# Patient Record
Sex: FEMALE | Race: WHITE | NOT HISPANIC OR LATINO | Employment: FULL TIME | URBAN - METROPOLITAN AREA
[De-identification: names, ages, dates, MRNs, and addresses within clinical notes are randomized per-mention and may not be internally consistent; named-entity substitution may affect disease eponyms.]

---

## 2020-06-01 ENCOUNTER — OFFICE VISIT (OUTPATIENT)
Dept: OBSTETRICS AND GYNECOLOGY | Age: 45
End: 2020-06-01

## 2020-06-01 VITALS
WEIGHT: 129 LBS | DIASTOLIC BLOOD PRESSURE: 80 MMHG | BODY MASS INDEX: 22.86 KG/M2 | HEIGHT: 63 IN | SYSTOLIC BLOOD PRESSURE: 130 MMHG

## 2020-06-01 DIAGNOSIS — Z12.4 SCREENING FOR CERVICAL CANCER: ICD-10-CM

## 2020-06-01 DIAGNOSIS — Z80.3 FAMILY HISTORY OF BREAST CANCER: ICD-10-CM

## 2020-06-01 DIAGNOSIS — Z20.2 EXPOSURE TO STD: ICD-10-CM

## 2020-06-01 DIAGNOSIS — R92.8 ABNORMAL MAMMOGRAM: Primary | ICD-10-CM

## 2020-06-01 PROBLEM — Z97.5 IUD CONTRACEPTION: Status: ACTIVE | Noted: 2018-09-18

## 2020-06-01 PROBLEM — F41.9 ANXIETY: Status: ACTIVE | Noted: 2020-06-01

## 2020-06-01 PROCEDURE — 99386 PREV VISIT NEW AGE 40-64: CPT | Performed by: OBSTETRICS & GYNECOLOGY

## 2020-06-01 RX ORDER — VALACYCLOVIR HYDROCHLORIDE 1 G/1
1000 TABLET, FILM COATED ORAL 2 TIMES DAILY
Qty: 20 TABLET | Refills: 4 | Status: SHIPPED | OUTPATIENT
Start: 2020-06-01

## 2020-06-01 RX ORDER — DEXTROAMPHETAMINE SACCHARATE, AMPHETAMINE ASPARTATE MONOHYDRATE, DEXTROAMPHETAMINE SULFATE AND AMPHETAMINE SULFATE 7.5; 7.5; 7.5; 7.5 MG/1; MG/1; MG/1; MG/1
30 CAPSULE, EXTENDED RELEASE ORAL EVERY MORNING
COMMUNITY
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
## 2020-06-02 LAB
HCV AB S/CO SERPL IA: <0.1 S/CO RATIO (ref 0–0.9)
HIV 1+2 AB+HIV1 P24 AG SERPL QL IA: NON REACTIVE
RPR SER QL: NORMAL

## 2020-06-03 ENCOUNTER — TELEPHONE (OUTPATIENT)
Dept: OBSTETRICS AND GYNECOLOGY | Age: 45
End: 2020-06-03

## 2020-06-03 LAB
C TRACH RRNA CVX QL NAA+PROBE: NEGATIVE
CONV .: NORMAL
CYTOLOGIST CVX/VAG CYTO: NORMAL
CYTOLOGY CVX/VAG DOC CYTO: NORMAL
CYTOLOGY CVX/VAG DOC THIN PREP: NORMAL
DX ICD CODE: NORMAL
HIV 1 & 2 AB SER-IMP: NORMAL
N GONORRHOEA RRNA CVX QL NAA+PROBE: NEGATIVE
OTHER STN SPEC: NORMAL
STAT OF ADQ CVX/VAG CYTO-IMP: NORMAL
T VAGINALIS RRNA SPEC QL NAA+PROBE: NEGATIVE

## 2020-06-03 NOTE — PROGRESS NOTES
Please notify that her pap was normal. Also her testing for gonorrhea, chlamydia, trichomonas and also HIV, hepatitis, syphilis were negative    Samantha De Oliveira MD  6/3/2020  12:24

## 2020-06-03 NOTE — TELEPHONE ENCOUNTER
----- Message from Samantha De Oliveira MD sent at 6/3/2020 12:24 PM EDT -----  Please notify that her pap was normal. Also her testing for gonorrhea, chlamydia, trichomonas and also HIV, hepatitis, syphilis were negative    Samantha De Oliveira MD  6/3/2020  12:24

## 2020-07-12 ENCOUNTER — APPOINTMENT (OUTPATIENT)
Dept: GENERAL RADIOLOGY | Facility: HOSPITAL | Age: 45
End: 2020-07-12

## 2020-07-12 PROCEDURE — 73130 X-RAY EXAM OF HAND: CPT | Performed by: EMERGENCY MEDICINE

## 2021-03-31 ENCOUNTER — BULK ORDERING (OUTPATIENT)
Dept: CASE MANAGEMENT | Facility: OTHER | Age: 46
End: 2021-03-31

## 2021-03-31 DIAGNOSIS — Z23 IMMUNIZATION DUE: ICD-10-CM

## 2021-06-29 ENCOUNTER — OFFICE VISIT (OUTPATIENT)
Dept: OBSTETRICS AND GYNECOLOGY | Age: 46
End: 2021-06-29

## 2021-06-29 VITALS
HEIGHT: 63 IN | DIASTOLIC BLOOD PRESSURE: 80 MMHG | SYSTOLIC BLOOD PRESSURE: 124 MMHG | WEIGHT: 128 LBS | BODY MASS INDEX: 22.68 KG/M2

## 2021-06-29 DIAGNOSIS — Z97.5 IUD CONTRACEPTION: ICD-10-CM

## 2021-06-29 DIAGNOSIS — Z11.51 SCREENING FOR HPV (HUMAN PAPILLOMAVIRUS): ICD-10-CM

## 2021-06-29 DIAGNOSIS — N92.6 IRREGULAR BLEEDING: ICD-10-CM

## 2021-06-29 DIAGNOSIS — Z01.419 WELL WOMAN EXAM WITH ROUTINE GYNECOLOGICAL EXAM: Primary | ICD-10-CM

## 2021-06-29 DIAGNOSIS — Z80.3 FAMILY HISTORY OF BREAST CANCER: ICD-10-CM

## 2021-06-29 DIAGNOSIS — Z72.0 TOBACCO ABUSE: ICD-10-CM

## 2021-06-29 PROCEDURE — 99396 PREV VISIT EST AGE 40-64: CPT | Performed by: PHYSICIAN ASSISTANT

## 2021-06-29 RX ORDER — HYDROXYZINE HYDROCHLORIDE 10 MG/1
TABLET, FILM COATED ORAL AS NEEDED
COMMUNITY
Start: 2021-06-02 | End: 2021-12-02

## 2021-06-29 RX ORDER — VARENICLINE TARTRATE 1 MG/1
1 TABLET, FILM COATED ORAL 2 TIMES DAILY
Qty: 56 TABLET | Refills: 1 | Status: SHIPPED | OUTPATIENT
Start: 2021-07-27 | End: 2021-08-17 | Stop reason: SDUPTHER

## 2021-06-29 RX ORDER — MOMETASONE FUROATE 50 UG/1
SPRAY, METERED NASAL
COMMUNITY
End: 2021-09-24

## 2021-06-29 NOTE — PROGRESS NOTES
Subjective     Chief Complaint   Patient presents with   • Gynecologic Exam     last pap 20 (-) MG 10/20/20 pt c/o recent chin hair growth        History of Present Illness    Misty Hernandez is a 46 y.o.  who presents for annual exam.    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  Would like to have this done    Wants rpt pap d/t remote h/o abnormal paps  No std testing needed    iud in place and happy with it  Has had some spotting and cramping recently  Can't feel strings but hasn't been able to     Has noted some chin hair  Wonders about her hormones  Says her Mom had the same thing    Engaged and considering another child  Kaylie has no children    Is a smoker and aware she should quit  Has used chantix in the past with good results    Pt of Dr De Oliveira  Her menses are irregular, lasting 0-3 days, dysmenorrhea none   Obstetric History:  OB History        2    Para   2    Term   2            AB        Living   2       SAB        TAB        Ectopic        Molar        Multiple        Live Births   2               Menstrual History:     No LMP recorded. Patient has had an implant.         Current contraception: IUD  History of abnormal Pap smear: yes - at 20 yoa  Received Gardasil immunization: no  Perform regular self breast exam: yes - occl  Family history of uterine or ovarian cancer: no  Family History of colon cancer: yes - dad  Family history of breast cancer: yes - sister, dad and brother    Mammogram: up to date.  Colonoscopy: not indicated.  DEXA: not indicated.    Exercise:  "moderately active  Calcium/Vitamin D: adequate intake    The following portions of the patient's history were reviewed and updated as appropriate: allergies, current medications, past family history, past medical history, past social history, past surgical history and problem list.    Review of Systems   Genitourinary:        Spotting   All other systems reviewed and are negative.      Review of Systems   Constitutional: Negative for fatigue.   Respiratory: Negative for shortness of breath.    Gastrointestinal: Negative for abdominal pain.   Genitourinary: Negative for dysuria.   Neurological: Negative for headaches.   Psychiatric/Behavioral: Negative for dysphoric mood.         Objective   Physical Exam    /80   Ht 160 cm (63\")   Wt 58.1 kg (128 lb)   Breastfeeding No   BMI 22.67 kg/m²   General:   alert, comfortable and no distress   Heart: regular rate and rhythm   Lungs: clear to auscultation bilaterally   Breast: normal appearance, no masses or tenderness, Inspection negative, No nipple retraction or dimpling, No nipple discharge or bleeding, No axillary or supraclavicular adenopathy, Normal to palpation without dominant masses, positive findings: implants bilaterally   Neck: no adenopathy and no carotid bruit   Abdomen: {normal findings: soft, non-tender   CVA: Not performed today   Pelvis: External genitalia: normal general appearance  Vaginal: normal mucosa without prolapse or lesions  Cervix: normal appearance, thin prep PAP obtained and iud string NOT seen or palpable   Adnexa: normal bimanual exam  Uterus: normal single, nontender   Extremities: Not performed today   Neurologic: negative   Psychiatric: Normal affect, judgement, and mood     Assessment/Plan   Diagnoses and all orders for this visit:    1. Well woman exam with routine gynecological exam (Primary)  -     Mammo Screening Digital Tomosynthesis Bilateral With CAD  -     IGP, Rfx Aptima HPV ASCU    2. IUD contraception    3. Family " history of breast cancer  -     Mammo Screening Digital Tomosynthesis Bilateral With CAD  -     [facility] Hereditary Cancer Screen    4. Screening for HPV (human papillomavirus)  -     IGP, Rfx Aptima HPV ASCU    5. Irregular bleeding  -     TSH  -     Follicle Stimulating Hormone    6. Tobacco abuse    Other orders  -     varenicline (CHANTIX NAVEED) 0.5 MG X 11 & 1 MG X 42 tablet; Take 0.5 mg po daily x 3 days, then 0.5 mg po bid x 4 days, then 1 mg po bid  Dispense: 53 tablet; Refill: 0  -     varenicline (Chantix Continuing Month Naveed) 1 MG tablet; Take 1 tablet by mouth 2 (Two) Times a Day for 56 days.  Dispense: 56 tablet; Refill: 1        All questions answered.  Breast self exam technique reviewed and patient encouraged to perform self-exam monthly.  Discussed healthy lifestyle modifications.  Recommended 30 minutes of aerobic exercise five times per week.  Discussed calcium needs to prevent osteoporosis.    Pap utd  Labs ordered to assess hormones/thyroid  Will do additional genetic testing, plan mammogram and will order MRI in Dec  Disc pelvic u/s to further eval iud, she will call if she wants to do this (strings were barely seen last year but were able to be teased out, I was unable to tease them out today)  Also disc visit with breast surgeon to further discuss ways to reduce breast cancer or capture it early. She will consider    Misty Hernandez  reports that she has been smoking cigarettes. She has been smoking about 1.00 pack per day. She has never used smokeless tobacco.. I have educated her on the risk of diseases from using tobacco products such as cancer, COPD and heart disease.     I advised her to quit and she is willing to quit. We have discussed the following method/s for tobacco cessation:  Counseling Prescription Medicaiton.  Together we have set a quit date for not sure yet.  She will follow up with me in as needed  or sooner to check on her progress.    I spent 3  minutes counseling the  patient.

## 2021-06-30 LAB
FSH SERPL-ACNC: 5.1 MIU/ML
TSH SERPL DL<=0.005 MIU/L-ACNC: 0.94 UIU/ML (ref 0.27–4.2)

## 2021-07-01 LAB
CONV .: NORMAL
CYTOLOGIST CVX/VAG CYTO: NORMAL
CYTOLOGY CVX/VAG DOC CYTO: NORMAL
CYTOLOGY CVX/VAG DOC THIN PREP: NORMAL
DX ICD CODE: NORMAL
HIV 1 & 2 AB SER-IMP: NORMAL
OTHER STN SPEC: NORMAL
STAT OF ADQ CVX/VAG CYTO-IMP: NORMAL

## 2021-08-17 ENCOUNTER — TELEPHONE (OUTPATIENT)
Dept: OBSTETRICS AND GYNECOLOGY | Age: 46
End: 2021-08-17

## 2021-08-17 RX ORDER — VARENICLINE TARTRATE 1 MG/1
1 TABLET, FILM COATED ORAL 2 TIMES DAILY
Qty: 56 TABLET | Refills: 1 | Status: SHIPPED | OUTPATIENT
Start: 2021-08-17 | End: 2021-10-12

## 2021-08-17 NOTE — TELEPHONE ENCOUNTER
Left message    [facility] has Chantix on back order and cant get it in anytime soon.  If she would like we can send it to a different pharmacy if she would like.  Not sure if they would have it but we can try.

## 2021-10-20 ENCOUNTER — TELEPHONE (OUTPATIENT)
Dept: OBSTETRICS AND GYNECOLOGY | Age: 46
End: 2021-10-20

## 2021-10-20 DIAGNOSIS — R92.2 INCONCLUSIVE MAMMOGRAM: Primary | ICD-10-CM

## 2021-10-20 NOTE — TELEPHONE ENCOUNTER
[facility] calling; Pt is scheduled with them Friday for a mg but order was placed for a screening mg but she needs a bilateral dx mg order placed- f/u from mg 6/2020 to document stability from breast calcification.     Fax number [phone]  I will fax order if you can place it.

## 2021-10-22 DIAGNOSIS — R92.8 ABNORMAL MAMMOGRAM: Primary | ICD-10-CM

## 2021-10-26 ENCOUNTER — TELEPHONE (OUTPATIENT)
Dept: OBSTETRICS AND GYNECOLOGY | Age: 46
End: 2021-10-26

## 2021-10-26 NOTE — TELEPHONE ENCOUNTER
----- Message from HUMA Gavin sent at 10/25/2021  4:35 PM EDT -----  Let her know the breast imaging is wnl. They see no suspicious findings. She should plan her screening mammogram in 1 year

## 2021-12-02 ENCOUNTER — APPOINTMENT (OUTPATIENT)
Dept: GENERAL RADIOLOGY | Facility: HOSPITAL | Age: 46
End: 2021-12-02

## 2021-12-02 PROCEDURE — 73560 X-RAY EXAM OF KNEE 1 OR 2: CPT | Performed by: FAMILY MEDICINE

## 2022-03-07 ENCOUNTER — APPOINTMENT (OUTPATIENT)
Dept: GENERAL RADIOLOGY | Facility: HOSPITAL | Age: 47
End: 2022-03-07

## 2022-03-07 PROCEDURE — 73610 X-RAY EXAM OF ANKLE: CPT | Performed by: FAMILY MEDICINE

## 2022-07-06 ENCOUNTER — OFFICE VISIT (OUTPATIENT)
Dept: OBSTETRICS AND GYNECOLOGY | Age: 47
End: 2022-07-06

## 2022-07-06 VITALS
HEIGHT: 60 IN | DIASTOLIC BLOOD PRESSURE: 72 MMHG | BODY MASS INDEX: 25.13 KG/M2 | WEIGHT: 128 LBS | SYSTOLIC BLOOD PRESSURE: 126 MMHG

## 2022-07-06 DIAGNOSIS — Z80.41 FAMILY HISTORY OF OVARIAN CANCER: ICD-10-CM

## 2022-07-06 DIAGNOSIS — Z91.89 AT HIGH RISK FOR BREAST CANCER: ICD-10-CM

## 2022-07-06 DIAGNOSIS — Z80.3 FAMILY HISTORY OF BREAST CANCER IN SISTER: ICD-10-CM

## 2022-07-06 DIAGNOSIS — Z97.5 IUD CONTRACEPTION: ICD-10-CM

## 2022-07-06 DIAGNOSIS — Z01.419 WELL WOMAN EXAM WITH ROUTINE GYNECOLOGICAL EXAM: Primary | ICD-10-CM

## 2022-07-06 DIAGNOSIS — Z72.0 TOBACCO ABUSE: ICD-10-CM

## 2022-07-06 PROCEDURE — 99396 PREV VISIT EST AGE 40-64: CPT | Performed by: PHYSICIAN ASSISTANT

## 2022-07-06 NOTE — PROGRESS NOTES
Subjective     Chief Complaint   Patient presents with   • Gynecologic Exam     Annual exam last pap 2021 neg/no hpv, m/g 10/21/2021       History of Present Illness    Misty Mustafa is a 47 y.o.  who presents for annual exam.    She is dong well    She got  last summer  Has iud in place  Was considering a pregnancy but not sure she wants to deal with any possible issues given the new [place]andra vs nathan ruling  Has had it in for 5 years  Has had an occasional period more recently  Uses it for bc and cycle control     Still smoking  Aware she needs to quit but not ready to right now    She is seeing a new pcp, updated in chart    Her menses are rare, lasting 0-3 days, dysmenorrhea none   Obstetric History:  OB History        2    Para   2    Term   2            AB        Living   2       SAB        IAB        Ectopic        Molar        Multiple        Live Births   2               Menstrual History:     No LMP recorded. Patient has had an implant.         Current contraception: IUD  History of abnormal Pap smear: yes - at 20 yoa  Received Gardasil immunization: no  Perform regular self breast exam: yes - occl  Family history of uterine or ovarian cancer: yes - mom's side, pt tested neg for gene  Family History of colon cancer: yes - father with pancreatic cancer  Family history of breast cancer: yes - family history of cancer    Mammogram: up to date.  Colonoscopy: recommended.  DEXA: not indicated.    Exercise: moderately active  Calcium/Vitamin D: adequate intake    The following portions of the patient's history were reviewed and updated as appropriate: allergies, current medications, past family history, past medical history, past social history, past surgical history and problem list.    Review of Systems   All other systems reviewed and are negative.      Review of Systems   Constitutional: Negative for fatigue.   Respiratory: Negative for shortness of breath.    Gastrointestinal:  "Negative for abdominal pain.   Genitourinary: Negative for dysuria.   Neurological: Negative for headaches.   Psychiatric/Behavioral: Negative for dysphoric mood.         Objective   Physical Exam    /72   Ht 152.4 cm (60\")   Wt 58.1 kg (128 lb)   Breastfeeding No   BMI 25.00 kg/m²   General:   alert, comfortable and no distress   Heart: regular rate and rhythm   Lungs: clear to auscultation bilaterally   Breast: normal appearance, no masses or tenderness, Inspection negative, No nipple retraction or dimpling, No nipple discharge or bleeding, No axillary or supraclavicular adenopathy, Normal to palpation without dominant masses, positive findings: implants bilaterally   Neck: no adenopathy and no carotid bruit   Abdomen: normal findings: soft, non-tender   CVA: Not performed today   Pelvis: External genitalia: normal general appearance  Vaginal: normal mucosa without prolapse or lesions  Cervix: normal appearance, thin prep PAP obtained and iud string not visualized   Adnexa: normal bimanual exam  Uterus: normal single, nontender   Extremities: Not performed today   Neurologic: negative   Psychiatric: Normal affect, judgement, and mood     Assessment & Plan   Diagnoses and all orders for this visit:    1. Well woman exam with routine gynecological exam (Primary)    2. Family history of ovarian cancer    3. Family history of breast cancer in sister    4. IUD contraception    5. At high risk for breast cancer  -     Ambulatory Referral to Breast Surgery    6. Tobacco abuse        All questions answered.  Breast self exam technique reviewed and patient encouraged to perform self-exam monthly.  Discussed healthy lifestyle modifications.  Recommended 30 minutes of aerobic exercise five times per week.  Discussed calcium needs to prevent osteoporosis.      Pap done at pt request  Plan referral to breast specialist since pt is at high risk for breast cancer  Would suggest mammogram and MRI alternating, she is " unsure if this will be covered given her insurance, can discuss further with breast specialist  iud strings not seen, this is stable. Would neeed iud remover when due for removal/replacement  Keep iud in place currently, can plan connor MEAD given.      Misty LAYTON Mustafa  reports that she has been smoking cigarettes. She has been smoking about 1.00 pack per day. She has never used smokeless tobacco.. I have educated her on the risk of diseases from using tobacco products such as cancer, COPD and heart disease.     I advised her to quit and she is not willing to quit.    I spent 3  minutes counseling the patient.

## 2022-07-08 LAB
CYTOLOGIST CVX/VAG CYTO: NORMAL
CYTOLOGY CVX/VAG DOC CYTO: NORMAL
CYTOLOGY CVX/VAG DOC THIN PREP: NORMAL
DX ICD CODE: NORMAL
HIV 1 & 2 AB SER-IMP: NORMAL
HPV I/H RISK 4 DNA CVX QL PROBE+SIG AMP: NEGATIVE
OTHER STN SPEC: NORMAL
STAT OF ADQ CVX/VAG CYTO-IMP: NORMAL

## 2022-08-15 ENCOUNTER — OFFICE VISIT (OUTPATIENT)
Dept: MAMMOGRAPHY | Facility: CLINIC | Age: 47
End: 2022-08-15

## 2022-08-15 ENCOUNTER — PATIENT ROUNDING (BHMG ONLY) (OUTPATIENT)
Dept: MAMMOGRAPHY | Facility: CLINIC | Age: 47
End: 2022-08-15

## 2022-08-15 VITALS
WEIGHT: 125 LBS | DIASTOLIC BLOOD PRESSURE: 78 MMHG | HEIGHT: 62 IN | OXYGEN SATURATION: 99 % | BODY MASS INDEX: 23 KG/M2 | HEART RATE: 103 BPM | SYSTOLIC BLOOD PRESSURE: 138 MMHG

## 2022-08-15 DIAGNOSIS — Z12.31 ENCOUNTER FOR SCREENING MAMMOGRAM FOR HIGH-RISK PATIENT: ICD-10-CM

## 2022-08-15 DIAGNOSIS — Z91.89 AT HIGH RISK FOR BREAST CANCER: Primary | ICD-10-CM

## 2022-08-15 PROCEDURE — 99204 OFFICE O/P NEW MOD 45 MIN: CPT | Performed by: SURGERY

## 2022-08-15 NOTE — PROGRESS NOTES
Chief Complaint: Misty Mustafa is a 47 y.o.. female here today for FHx Breast Cancer        History of Present Illness:  Patient presents with family history breast cancer.   She is a very nice 47-year-old white female with a very significant family history.  Apparently her father (71), brother (56), sister (63), sister (54) all had breast cancer.  Her father also had pancreatic cancer and she had another sister with brain cancer.  The patient underwent genetic testing back in 2012 but we think it was only for the BRCA gene.  In the last year or so she is undergone extended panel testing and was still negative for any genetic mutation.  She also has one of her sisters who developed breast cancer undergo genetic testing and she was also negative.  The patient did have some calcifications discovered on mammography roughly 2 years ago.  She ultimately had a stereotactic biopsy of that left breast with no evidence of cancer being detected.  Her most recent mammograms were performed in October 2021.  I personally reviewed those imaging studies.  She has a clip in the left breast.  The breast tissue itself is heterogeneously dense with implants in place.  There were no new calcifications or suspicious masses or areas of architectural distortion.    Review of Systems:  Review of Systems   Skin:        The patient denies any noticeable changes to the skin of the breast.    Psychiatric/Behavioral: The patient is nervous/anxious.    All other systems reviewed and are negative.     I have reviewed the ROS as documented by the MA/LPN/RN Milo Lyons MD      Past Medical and Surgical History:  Breast Biopsy History:  Patient has had the following breast biopsies:1- benign calcs  Breast Cancer HIstory:  Patient does not have a past medical history of breast cancer.  Breast Operations, and year:  0  Social History     Tobacco Use   Smoking Status Heavy Tobacco Smoker   • Packs/day: 1.00   • Types: Cigarettes   Smokeless  Tobacco Never Used     Obstetric History:  Patient is premenopausal, first day of last period: Pt unsure she has an IUD  Number of pregnancies:4  Number of live births: 2  Number of abortions or miscarriages: 2  Age of delivery of first child: 37  Patient breast fed, for the following lenth of time: 14 months  Length of time taking birth control pills:20 years  Patient has never taken hormone replacement    Past Surgical History:   Procedure Laterality Date   • BREAST SURGERY      implants   •  SECTION     • COLONOSCOPY      had blood in stool, small polyp that was removed. maybe    • DILATATION AND CURETTAGE     • HEMORRHOIDECTOMY         Past Medical History:   Diagnosis Date   • ADHD    • Anxiety    • Cervical dysplasia     when 20 yrs old, cryo and normal since   • Hemorrhoid    • Hyperlipidemia    • PTSD (post-traumatic stress disorder)        Prior Hospitalizations, other than for surgery or childbirth, and year:  0    Social History:  Patient is .  Patient has 2 sons.    Family History:  Family History   Problem Relation Age of Onset   • Breast cancer Father    • Pancreatic cancer Father    • Hypertension Father    • Hyperlipidemia Father    • Breast cancer Sister    • Hypertension Sister    • Breast cancer Brother    • Stomach cancer Brother    • Hypertension Brother        Vital Signs:  Vitals:    08/15/22 1437   BP: 138/78   Pulse: 103   SpO2: 99%       Medications:    Current Outpatient Prescriptions:     Current Outpatient Medications:   •  amphetamine-dextroamphetamine XR (ADDERALL XR) 30 MG 24 hr capsule, Take 30 mg by mouth Every Morning, Disp: , Rfl:   •  levonorgestrel (MIRENA) 20 MCG/24HR IUD, 1 each by Intrauterine route., Disp: , Rfl:   •  valACYclovir (VALTREX) 1000 MG tablet, Take 1 tablet by mouth 2 (Two) Times a Day., Disp: 20 tablet, Rfl: 4    Physical Examination:  General Appearance:   Patient is in no distress.  She is well kept and has an average build.    Psychiatric:  Patient with appropriate mood and affect. Alert and oriented to self, time, and place.    Breast, RIGHT:  medium sized, symmetric with the contralateral side.  Breast skin is without erythema, edema, rashes.   She has some scars around the areola from her implant placement.  There are no visible abnormalities upon inspection during the arm-raising maneuver or with hands on hips in the sitting position. There is no nipple retraction, discharge or nipple/areolar skin changes.There are no masses palpable in the sitting or supine positions.    Breast, LEFT:  medium sized, symmetric with the contralateral side.  Breast skin is without erythema, edema, rashes.   She has a scar around the areola from her implant placement.  There are no visible abnormalities upon inspection during the arm-raising maneuver or with hands on hips in the sitting position. There is no nipple retraction, discharge or nipple/areolar skin changes.There are no masses palpable in the sitting or supine positions.    Lymphatic:  There is no axillary, cervical, infraclavicular, or supraclavicular adenopathy bilaterally.    Gastrointestinal:  Abdomen is soft, nondistended, and nontender.  There was no obvious hepatosplenomegaly or abdominal mass.  There are  scars from previous  surgery.    Musculoskeletal:  Good strength in all 4 extremities.   There is good range of motion in both shoulders.        Assessment:  1. At high risk for breast cancer    The patient was made aware of the usual factors associated with high risk breast cancer patients.  The factors are typically a strong family history for breast cancer, a known genetic mutation, LCIS, atypical hyperplasia, or a history of radiation to the chest wall under the age of 30.  The patient's risk is associated with her strong family history.  I risk assessment models have her lifetime risk estimated at 49 to 65%.  Her 5-year risk is also elevated at 4.2%.  These findings  would certainly qualify her to undergo supplemental MRI imaging and she would like to do that.  She would also qualify to consider medical risk reduction.  I discussed briefly what was involved with that and she is definitely not ready to pursue anything like that at this point in time.    The patient is also aware of the benefits of exercise, maintaining ideal body weight, and limiting alcohol intake.      Plan:  1.  I will order an MRI for now and call her with those results.  2.  I will place orders for her mammograms to be performed in November of this year.  I will also call her with those results.  3.  The patient does not want to consider hormone blocking therapy at this point in time.  4.  I will see her back in the office in 1 year.      CPT coding:    Next Appointment:  No follow-ups on file.            EMR Dragon/transcription disclaimer:    Much of this encounter note is an electronic transcription/translocation of spoken language to printed text.  The electronic translation of spoken language may permit erroneous, or at times, nonsensical words or phrases to be inadvertently transcribed.  Although I have reviewed the note from such areas, some may still exist.

## 2022-08-15 NOTE — PROGRESS NOTES
August 15, 2022    Hello, may I speak with Misty Mustafa?    My name is Marsha       I am  with [facility]  [address]  [facility] [address] [phone].    Before we get started may I verify your date of birth? 1975    I am calling to officially welcome you to our practice and ask about your recent visit. Is this a good time to talk? Yes, In office    Tell me about your visit with us. What things went well?  Everyone was friendly and accommodating.       We're always looking for ways to make our patients' experiences even better. Do you have recommendations on ways we may improve?  No    Overall were you satisfied with your first visit to our practice? Yes       I appreciate you taking the time to speak with me today. Is there anything else I can do for you? No     Thank you, and have a great day.

## 2022-09-06 ENCOUNTER — HOSPITAL ENCOUNTER (OUTPATIENT)
Dept: MRI IMAGING | Facility: HOSPITAL | Age: 47
Discharge: HOME | End: 2022-09-06
Admitting: SURGERY

## 2022-09-06 DIAGNOSIS — Z91.89 AT HIGH RISK FOR BREAST CANCER: ICD-10-CM

## 2022-09-06 PROCEDURE — 77049 MRI BREAST C-+ W/CAD BI: CPT

## 2022-09-06 PROCEDURE — A9577 INJ MULTIHANCE: HCPCS | Performed by: SURGERY

## 2022-09-06 PROCEDURE — 0 GADOBENATE DIMEGLUMINE 529 MG/ML SOLUTION: Performed by: SURGERY

## 2022-09-06 RX ADMIN — GADOBENATE DIMEGLUMINE 10 ML: 529 INJECTION, SOLUTION INTRAVENOUS at 18:03

## 2022-09-07 ENCOUNTER — TELEPHONE (OUTPATIENT)
Dept: MAMMOGRAPHY | Facility: CLINIC | Age: 47
End: 2022-09-07

## 2022-09-07 NOTE — TELEPHONE ENCOUNTER
I left a message stating the MRI looked normal.  She does have a mammogram scheduled for November and I told her I would call her with those results as well.  If everything continues to image well, I will see her back in the office in 1 year.

## 2022-10-24 ENCOUNTER — HOSPITAL ENCOUNTER (OUTPATIENT)
Dept: MAMMOGRAPHY | Facility: HOSPITAL | Age: 47
Discharge: HOME | End: 2022-10-24
Admitting: SURGERY

## 2022-10-24 DIAGNOSIS — Z12.31 ENCOUNTER FOR SCREENING MAMMOGRAM FOR HIGH-RISK PATIENT: ICD-10-CM

## 2022-10-24 PROCEDURE — 77063 BREAST TOMOSYNTHESIS BI: CPT

## 2022-10-24 PROCEDURE — 77067 SCR MAMMO BI INCL CAD: CPT

## 2022-10-26 ENCOUNTER — TELEPHONE (OUTPATIENT)
Dept: MAMMOGRAPHY | Facility: CLINIC | Age: 47
End: 2022-10-26

## 2022-10-26 NOTE — TELEPHONE ENCOUNTER
I spoke with her today and told her that her recent mammograms showed intact implants but no suspicious masses or calcifications.  I will see her back next August.

## 2022-11-03 ENCOUNTER — TELEPHONE (OUTPATIENT)
Dept: OBSTETRICS AND GYNECOLOGY | Age: 47
End: 2022-11-03

## 2022-11-03 NOTE — TELEPHONE ENCOUNTER
----- Message from HUMA Gavin sent at 11/3/2022  8:41 AM EDT -----  I guess if that is what she wants, but may need to confirm that with the pt  ----- Message -----  From: Chanda Swain MA  Sent: 11/2/2022   3:52 PM EDT  To: HUMA Gavin    This pt is scheduled next week for an iud insert.  The paperwork that was filled out was mirena and a mirena was sent.  But it looks like you mentioned kyfannyena.  Is a mirena ok?

## 2022-11-07 ENCOUNTER — OFFICE VISIT (OUTPATIENT)
Dept: OBSTETRICS AND GYNECOLOGY | Age: 47
End: 2022-11-07

## 2022-11-07 VITALS
SYSTOLIC BLOOD PRESSURE: 128 MMHG | HEIGHT: 62 IN | BODY MASS INDEX: 24.29 KG/M2 | WEIGHT: 132 LBS | DIASTOLIC BLOOD PRESSURE: 82 MMHG

## 2022-11-07 DIAGNOSIS — Z30.430 ENCOUNTER FOR INSERTION OF MIRENA IUD: Primary | ICD-10-CM

## 2022-11-07 DIAGNOSIS — Z30.432 ENCOUNTER FOR IUD REMOVAL: ICD-10-CM

## 2022-11-07 LAB
B-HCG UR QL: NEGATIVE
EXPIRATION DATE: NORMAL
INTERNAL NEGATIVE CONTROL: NORMAL
INTERNAL POSITIVE CONTROL: NORMAL
Lab: NORMAL

## 2022-11-07 PROCEDURE — 58300 INSERT INTRAUTERINE DEVICE: CPT | Performed by: PHYSICIAN ASSISTANT

## 2022-11-07 PROCEDURE — 58301 REMOVE INTRAUTERINE DEVICE: CPT | Performed by: PHYSICIAN ASSISTANT

## 2022-11-07 PROCEDURE — 81025 URINE PREGNANCY TEST: CPT | Performed by: PHYSICIAN ASSISTANT

## 2022-11-07 NOTE — PROGRESS NOTES
IUD Removal    Date of procedure:  11/7/2022    Risks and benefits discussed? yes  All questions answered? yes  Consents given by The patient  Reason for removal: Side effect: bleeding        Procedure documentation:    A speculum was placed in order to view the cervix.  .  The IUD string was not easily seen.(string finder utilized)  The string was grasped and the IUD was removed without difficulty.  The IUD did not appear to be adherent to the uterine cavity. It was removed intact.    She tolerated the procedure without any difficulty.     Post procedure instructions: Patient notified to call with heavy bleeding, fever or increasing pain.    Follow up needed: 6 week(s) for iud check      IUD Insertion    No LMP recorded. Patient has had an implant.    Date of procedure:  11/7/2022    Risks and benefits discussed? yes  All questions answered? yes  Consents given by The patient  Written consent obtained? yes    Procedure documentation:     Urine pregnancy test was done and was NEGATIVE .  The risks (including infection,  bleeding, pain, and uterine perforation) and benefits of the procedure were  explained to the patient and Written informed consent was  obtained.    After verifying the patient had a low probability of being pregnant and met the criteria for insertion, a sterile speculum has placed and the cervix was cleansed with an antiseptic solution.  Vaginal discharge was scant.  The anterior lip of the cervix was grasped with an allis and the uterine cavity was gently sounded. There was no difficulty passing the sound through the cervix.  Cervical dilation did not need to be performed prior to placing the IUD.  The uterus was anteverted and sounded to 8 cms.  The Mirena was then prepared per the manufacturers instructions.    The Mirena was advanced to a point 2 cms from the fundus and then the arms were released from the sheath.  The device was advanced to the fundus and the device was released fully from the  sheath.. The string was cut 2 cms in length.  Bleeding from the cervix was scant.    She tolerated the procedure without any difficulty.    Post procedure instructions: The patient was advised to call for any fever or for  prolonged or severe pain or bleeding. Pelvic rest  advised for 2 wks    Follow up needed: 6 weeks for IUD check    U/s done and IUD in place

## 2023-08-14 ENCOUNTER — OFFICE VISIT (OUTPATIENT)
Dept: MAMMOGRAPHY | Facility: CLINIC | Age: 48
End: 2023-08-14
Payer: MEDICAID

## 2023-08-14 VITALS
DIASTOLIC BLOOD PRESSURE: 90 MMHG | SYSTOLIC BLOOD PRESSURE: 153 MMHG | BODY MASS INDEX: 23 KG/M2 | OXYGEN SATURATION: 100 % | WEIGHT: 125 LBS | HEIGHT: 62 IN | HEART RATE: 82 BPM

## 2023-08-14 DIAGNOSIS — Z12.39 SCREENING BREAST EXAMINATION: ICD-10-CM

## 2023-08-14 DIAGNOSIS — Z91.89 AT HIGH RISK FOR BREAST CANCER: Primary | ICD-10-CM

## 2023-08-14 PROCEDURE — 99213 OFFICE O/P EST LOW 20 MIN: CPT | Performed by: SURGERY

## 2023-08-14 PROCEDURE — 1159F MED LIST DOCD IN RCRD: CPT | Performed by: SURGERY

## 2023-08-14 PROCEDURE — 1160F RVW MEDS BY RX/DR IN RCRD: CPT | Performed by: SURGERY

## 2023-08-14 NOTE — PROGRESS NOTES
Subjective   Misty Mustafa is a 48 y.o. female     History of Present Illness   She is a nice 48-year-old white female with a very strong family history for breast cancer.  The patient's father, brother, and 2 sisters have all had breast cancer.  Her father also had pancreatic cancer and another sister had brain cancer.  The patient has had updated genetic testing which shows no pathologic mutations.    The patient's last mammograms were performed in 2022.  I have personally reviewed those imaging studies along with the reports.  The patient does have bilateral subpectoral implants.  The breast tissue is heterogeneously dense as well.  I do not see any worrisome microcalcifications or suspicious masses.    Her last MRI was performed in 2022 and there was no concerning masslike or non-mass-like enhancement.    The patient has a Mirena in place.    Review of Systems musculoskeletal-recent extensor tendon injury of the left hand requiring surgery.  Past Medical History:   Diagnosis Date    ADHD     Anxiety     Cervical dysplasia     when 20 yrs old, cryo and normal since    Hemorrhoid     Hyperlipidemia     PTSD (post-traumatic stress disorder)      Past Surgical History:   Procedure Laterality Date    BREAST SURGERY      implants     SECTION      COLONOSCOPY      had blood in stool, small polyp that was removed. maybe     DILATATION AND CURETTAGE      HEMORRHOIDECTOMY       Family History   Problem Relation Age of Onset    Breast cancer Father     Pancreatic cancer Father     Hypertension Father     Hyperlipidemia Father     Breast cancer Sister     Hypertension Sister     Breast cancer Brother     Stomach cancer Brother     Hypertension Brother      Social History     Socioeconomic History    Marital status:    Tobacco Use    Smoking status: Heavy Smoker     Packs/day: 1.00     Types: Cigarettes    Smokeless tobacco: Never   Vaping Use    Vaping Use: Never used   Substance and  Sexual Activity    Alcohol use: Yes     Alcohol/week: 1.0 standard drink     Types: 1 Shots of liquor per week     Comment: social     Drug use: Never    Sexual activity: Yes     Partners: Male     Birth control/protection: I.U.D.     Comment: mirena        Objective   Physical Exam  Constitutional-BMI 22.9, no acute distress  Right breast-large and symmetrical.  She has well-healed scars along the inferior edge of the areola.  There is an implant in place that is intact.  I do not palpate any masses in the breast that are concerning.  There is no skin dimpling or nipple retraction with the arms raised and I could not express any nipple discharge.  Left breast-large and symmetrical.  The skin of the breast is normal with a well-healed scar along the inferior edge of the areola.  The implant is intact.  With the arms raised there is no skin dimpling or nipple retraction.  The could not express any nipple discharge nor could I palpate any worrisome masses within the breast.  Lymphatics-no cervical or axillary adenopathy.    Assessment & Plan   At high risk for breast cancer-previous risk assessment has estimated her lifetime risk of developing breast cancer between 49 and 65%.  Her 5-year risk is elevated at 4.2%.  She would like to continue alternating mammograms and MRIs.  She is still not ready to consider chemoprevention at this point in time.  I will alternate 6-month visits with KIM Javed and plan to see her back in the office in 1 year.    The encounter diagnosis was At high risk for breast cancer.

## 2023-08-14 NOTE — LETTER
2023     HUMA Gavin  [address] [facility]  [address] [place], [place] [id number]    Patient: Misty Mustafa   YOB: 1975   Date of Visit: 2023     Dear HUMA Gavin:       Thank you for referring Misty Mustafa to me for evaluation. Below are the relevant portions of my assessment and plan of care.    If you have questions, please do not hesitate to call me. I look forward to following Misty along with you.         Sincerely,        Milo Lyons MD        CC: KIM Rondon William P, MD  23 1516  Sign when Signing Visit  Subjective  Misty Mustafa is a 48 y.o. female     History of Present Illness   She is a nice 48-year-old white female with a very strong family history for breast cancer.  The patient's father, brother, and 2 sisters have all had breast cancer.  Her father also had pancreatic cancer and another sister had brain cancer.  The patient has had updated genetic testing which shows no pathologic mutations.    The patient's last mammograms were performed in 2022.  I have personally reviewed those imaging studies along with the reports.  The patient does have bilateral subpectoral implants.  The breast tissue is heterogeneously dense as well.  I do not see any worrisome microcalcifications or suspicious masses.    Her last MRI was performed in 2022 and there was no concerning masslike or non-mass-like enhancement.    The patient has a Mirena in place.    Review of Systems musculoskeletal-recent extensor tendon injury of the left hand requiring surgery.  Past Medical History:   Diagnosis Date    ADHD     Anxiety     Cervical dysplasia     when 20 yrs old, cryo and normal since    Hemorrhoid     Hyperlipidemia     PTSD (post-traumatic stress disorder)      Past Surgical History:   Procedure Laterality Date    BREAST SURGERY      implants     SECTION      COLONOSCOPY      had blood in stool,  small polyp that was removed. maybe 2015    DILATATION AND CURETTAGE      HEMORRHOIDECTOMY       Family History   Problem Relation Age of Onset    Breast cancer Father     Pancreatic cancer Father     Hypertension Father     Hyperlipidemia Father     Breast cancer Sister     Hypertension Sister     Breast cancer Brother     Stomach cancer Brother     Hypertension Brother      Social History     Socioeconomic History    Marital status:    Tobacco Use    Smoking status: Heavy Smoker     Packs/day: 1.00     Types: Cigarettes    Smokeless tobacco: Never   Vaping Use    Vaping Use: Never used   Substance and Sexual Activity    Alcohol use: Yes     Alcohol/week: 1.0 standard drink     Types: 1 Shots of liquor per week     Comment: social     Drug use: Never    Sexual activity: Yes     Partners: Male     Birth control/protection: I.U.D.     Comment: mirena        Objective  Physical Exam  Constitutional-BMI 22.9, no acute distress  Right breast-large and symmetrical.  She has well-healed scars along the inferior edge of the areola.  There is an implant in place that is intact.  I do not palpate any masses in the breast that are concerning.  There is no skin dimpling or nipple retraction with the arms raised and I could not express any nipple discharge.  Left breast-large and symmetrical.  The skin of the breast is normal with a well-healed scar along the inferior edge of the areola.  The implant is intact.  With the arms raised there is no skin dimpling or nipple retraction.  The could not express any nipple discharge nor could I palpate any worrisome masses within the breast.  Lymphatics-no cervical or axillary adenopathy.    Assessment & Plan  At high risk for breast cancer-previous risk assessment has estimated her lifetime risk of developing breast cancer between 49 and 65%.  Her 5-year risk is elevated at 4.2%.  She would like to continue alternating mammograms and MRIs.  She is still not  ready to consider chemoprevention at this point in time.  I will alternate 6-month visits with KIM Javed and plan to see her back in the office in 1 year.    The encounter diagnosis was At high risk for breast cancer.

## 2023-08-14 NOTE — LETTER
August 14, 2023     Patient: Misty Mustafa   YOB: 1975   Date of Visit: 8/14/2023       To Whom it May Concern:    Misty Mustafa was seen in my clinic on 8/14/2023. She {Return to school/sport:14042}.    If you have any questions or concerns, please don't hesitate to call.         Sincerely,          Milo Lyons MD        CC:   No Recipients

## 2023-08-14 NOTE — LETTER
2023     HUMA Gavin  [address] [facility]  [address] [place] [id number]    Patient: Misty Mustafa   YOB: 1975   Date of Visit: 2023     Dear HUMA Gavin:       Thank you for referring Misty Mustafa to me for evaluation. Below are the relevant portions of my assessment and plan of care.    If you have questions, please do not hesitate to call me. I look forward to following Misty along with you.         Sincerely,        Milo Lyons MD        CC: KIM Rondon William P, MD  23 1513  Sign when Signing Visit  Subjective  Misty Mustafa is a 48 y.o. female     History of Present Illness   She is a nice 48-year-old white female with a very strong family history for breast cancer.  The patient's father, brother, and 2 sisters have all had breast cancer.  Her father also had pancreatic cancer and another sister had brain cancer.  The patient has had updated genetic testing which shows no pathologic mutations.    The patient's last mammograms were performed in 2022.  I have personally reviewed those imaging studies along with the reports.  The patient does have bilateral subpectoral implants.  The breast tissue is heterogeneously dense as well.  I do not see any worrisome microcalcifications or suspicious masses.    Her last MRI was performed in 2022 and there was no concerning masslike or non-mass-like enhancement.    The patient has a Mirena in place.    Review of Systems musculoskeletal-recent extensor tendon injury of the left hand requiring surgery.  Past Medical History:   Diagnosis Date    ADHD     Anxiety     Cervical dysplasia     when 20 yrs old, cryo and normal since    Hemorrhoid     Hyperlipidemia     PTSD (post-traumatic stress disorder)      Past Surgical History:   Procedure Laterality Date    BREAST SURGERY      implants     SECTION      COLONOSCOPY      had blood in stool, small polyp  that was removed. maybe 2015    DILATATION AND CURETTAGE      HEMORRHOIDECTOMY       Family History   Problem Relation Age of Onset    Breast cancer Father     Pancreatic cancer Father     Hypertension Father     Hyperlipidemia Father     Breast cancer Sister     Hypertension Sister     Breast cancer Brother     Stomach cancer Brother     Hypertension Brother      Social History     Socioeconomic History    Marital status:    Tobacco Use    Smoking status: Heavy Smoker     Packs/day: 1.00     Types: Cigarettes    Smokeless tobacco: Never   Vaping Use    Vaping Use: Never used   Substance and Sexual Activity    Alcohol use: Yes     Alcohol/week: 1.0 standard drink     Types: 1 Shots of liquor per week     Comment: social     Drug use: Never    Sexual activity: Yes     Partners: Male     Birth control/protection: I.U.D.     Comment: mirena        Objective  Physical Exam  Constitutional-BMI 22.9, no acute distress  Right breast-large and symmetrical.  She has well-healed scars along the inferior edge of the areola.  There is an implant in place that is intact.  I do not palpate any masses in the breast that are concerning.  There is no skin dimpling or nipple retraction with the arms raised and I could not express any nipple discharge.  Left breast-large and symmetrical.  The skin of the breast is normal with a well-healed scar along the inferior edge of the areola.  The implant is intact.  With the arms raised there is no skin dimpling or nipple retraction.  The could not express any nipple discharge nor could I palpate any worrisome masses within the breast.  Lymphatics-no cervical or axillary adenopathy.    Assessment & Plan  At high risk for breast cancer-previous risk assessment has estimated her lifetime risk of developing breast cancer between 49 and 65%.  Her 5-year risk is elevated at 4.2%.  She would like to continue alternating mammograms and MRIs.  She is still not ready to consider chemoprevention  at this point in time.  I will alternate 6-month visits with KIM Javed and plan to see her back in the office in 1 year.    The encounter diagnosis was At high risk for breast cancer.

## 2023-10-01 ENCOUNTER — HOSPITAL ENCOUNTER (OUTPATIENT)
Dept: MRI IMAGING | Facility: HOSPITAL | Age: 48
Discharge: HOME | End: 2023-10-01
Admitting: SURGERY
Payer: MEDICAID

## 2023-10-01 DIAGNOSIS — Z91.89 AT HIGH RISK FOR BREAST CANCER: ICD-10-CM

## 2023-10-01 PROCEDURE — 0 GADOBENATE DIMEGLUMINE 529 MG/ML SOLUTION: Performed by: SURGERY

## 2023-10-01 PROCEDURE — A9577 INJ MULTIHANCE: HCPCS | Performed by: SURGERY

## 2023-10-01 PROCEDURE — 77049 MRI BREAST C-+ W/CAD BI: CPT

## 2023-10-01 RX ADMIN — GADOBENATE DIMEGLUMINE 12 ML: 529 INJECTION, SOLUTION INTRAVENOUS at 18:21

## 2023-10-02 ENCOUNTER — TELEPHONE (OUTPATIENT)
Dept: MAMMOGRAPHY | Facility: CLINIC | Age: 48
End: 2023-10-02
Payer: MEDICAID

## 2023-10-02 NOTE — TELEPHONE ENCOUNTER
I left a message stating that the recent MRI did not show any suspicious findings in either breast.  I am due to see her next August and I asked her to call if she had any questions otherwise we will see her in about a year.

## 2023-10-26 ENCOUNTER — HOSPITAL ENCOUNTER (OUTPATIENT)
Dept: MAMMOGRAPHY | Facility: HOSPITAL | Age: 48
Discharge: HOME | End: 2023-10-26
Admitting: SURGERY
Payer: MEDICAID

## 2023-10-26 DIAGNOSIS — Z12.39 SCREENING BREAST EXAMINATION: ICD-10-CM

## 2023-10-26 PROCEDURE — 77063 BREAST TOMOSYNTHESIS BI: CPT

## 2023-10-26 PROCEDURE — 77067 SCR MAMMO BI INCL CAD: CPT

## 2023-10-30 ENCOUNTER — TELEPHONE (OUTPATIENT)
Dept: MAMMOGRAPHY | Facility: CLINIC | Age: 48
End: 2023-10-30
Payer: MEDICAID

## 2023-10-30 NOTE — TELEPHONE ENCOUNTER
I left a message stating that the recent mammogram did not show any abnormalities in either breast.  I will see her back in the office in August 2024.

## 2024-11-27 ENCOUNTER — PATIENT ROUNDING (BHMG ONLY) (OUTPATIENT)
Dept: URGENT CARE | Facility: CLINIC | Age: 49
End: 2024-11-27
Payer: MEDICAID

## 2024-11-27 NOTE — ED NOTES
Thank you for letting us care for you during your recent visit at [facility]. We would love to hear about your experience with us.     We’re always looking for ways to make our patients’ experiences even better. Do you have any recommendations on ways we may improve?    I appreciate you taking the time to respond. Please be on the lookout for a survey about your recent visit from [facility] via text or email. We would greatly appreciate if you could fill that out and turn it back in. We want your voice to be heard and we value your feedback.     Thank you for choosing [facility] for your healthcare needs.